# Patient Record
Sex: MALE | URBAN - METROPOLITAN AREA
[De-identification: names, ages, dates, MRNs, and addresses within clinical notes are randomized per-mention and may not be internally consistent; named-entity substitution may affect disease eponyms.]

---

## 2020-11-01 ENCOUNTER — RECORDS - HEALTHEAST (OUTPATIENT)
Dept: LAB | Facility: CLINIC | Age: 70
End: 2020-11-01

## 2020-11-02 LAB
ANION GAP SERPL CALCULATED.3IONS-SCNC: 8 MMOL/L (ref 5–18)
BUN SERPL-MCNC: 44 MG/DL (ref 8–28)
CALCIUM SERPL-MCNC: 9.4 MG/DL (ref 8.5–10.5)
CHLORIDE BLD-SCNC: 110 MMOL/L (ref 98–107)
CO2 SERPL-SCNC: 21 MMOL/L (ref 22–31)
CREAT SERPL-MCNC: 1.1 MG/DL (ref 0.7–1.3)
ERYTHROCYTE [DISTWIDTH] IN BLOOD BY AUTOMATED COUNT: 14.9 % (ref 11–14.5)
GFR SERPL CREATININE-BSD FRML MDRD: >60 ML/MIN/1.73M2
GLUCOSE BLD-MCNC: 78 MG/DL (ref 70–125)
HCT VFR BLD AUTO: 32.4 % (ref 40–54)
HGB BLD-MCNC: 10.3 G/DL (ref 14–18)
MCH RBC QN AUTO: 31.7 PG (ref 27–34)
MCHC RBC AUTO-ENTMCNC: 31.8 G/DL (ref 32–36)
MCV RBC AUTO: 100 FL (ref 80–100)
PLATELET # BLD AUTO: 269 THOU/UL (ref 140–440)
PMV BLD AUTO: 10.5 FL (ref 8.5–12.5)
POTASSIUM BLD-SCNC: 4.8 MMOL/L (ref 3.5–5)
RBC # BLD AUTO: 3.25 MILL/UL (ref 4.4–6.2)
SODIUM SERPL-SCNC: 139 MMOL/L (ref 136–145)
WBC: 5.5 THOU/UL (ref 4–11)

## 2022-01-01 ENCOUNTER — HEALTH MAINTENANCE LETTER (OUTPATIENT)
Age: 72
End: 2022-01-01

## 2022-05-03 ENCOUNTER — MEDICAL CORRESPONDENCE (OUTPATIENT)
Dept: HEALTH INFORMATION MANAGEMENT | Facility: CLINIC | Age: 72
End: 2022-05-03

## 2022-05-25 ENCOUNTER — ANESTHESIA EVENT (OUTPATIENT)
Dept: SURGERY | Facility: AMBULATORY SURGERY CENTER | Age: 72
End: 2022-05-25
Payer: COMMERCIAL

## 2022-05-25 ENCOUNTER — TELEPHONE (OUTPATIENT)
Dept: GASTROENTEROLOGY | Facility: CLINIC | Age: 72
End: 2022-05-25

## 2022-05-25 DIAGNOSIS — Z11.59 ENCOUNTER FOR SCREENING FOR OTHER VIRAL DISEASES: Primary | ICD-10-CM

## 2022-05-25 NOTE — TELEPHONE ENCOUNTER
Spoke with representative at [facility], requested that images for CT abdomen/pelvis on 4/4/2022 be pushed to [place] per request of Dr. Pizarro.  Patient is being referred for EUS due to pancreatic lesions.  Images received in [place] PACS, provider notified.    Jessenia Don RN

## 2022-05-26 RX ORDER — ONDANSETRON 4 MG/1
4 TABLET, FILM COATED ORAL 3 TIMES DAILY PRN
COMMUNITY

## 2022-05-26 RX ORDER — TRAZODONE HYDROCHLORIDE 50 MG/1
50 TABLET, FILM COATED ORAL AT BEDTIME
COMMUNITY

## 2022-05-26 RX ORDER — PYRIDOSTIGMINE BROMIDE 60 MG/1
60 TABLET ORAL 3 TIMES DAILY
COMMUNITY

## 2022-05-26 RX ORDER — FERROUS GLUCONATE 324(37.5)
1 TABLET ORAL DAILY
COMMUNITY

## 2022-05-26 RX ORDER — ACETAMINOPHEN 325 MG/1
325 TABLET ORAL EVERY 6 HOURS PRN
COMMUNITY

## 2022-05-26 RX ORDER — INSULIN LISPRO 100 [IU]/ML
54 INJECTION, SOLUTION INTRAVENOUS; SUBCUTANEOUS EVERY MORNING
COMMUNITY

## 2022-05-26 RX ORDER — INSULIN LISPRO 100 [IU]/ML
30 INJECTION, SOLUTION INTRAVENOUS; SUBCUTANEOUS
COMMUNITY

## 2022-05-26 RX ORDER — POLYETHYLENE GLYCOL 3350 17 G/17G
1 POWDER, FOR SOLUTION ORAL DAILY PRN
COMMUNITY

## 2022-05-26 RX ORDER — LOPERAMIDE HCL 2 MG
2 CAPSULE ORAL 4 TIMES DAILY PRN
COMMUNITY

## 2022-05-26 RX ORDER — SODIUM BICARBONATE 325 MG/1
325 TABLET ORAL 3 TIMES DAILY
COMMUNITY

## 2022-05-26 RX ORDER — BACLOFEN 20 MG
1 TABLET ORAL 2 TIMES DAILY
COMMUNITY

## 2022-05-26 RX ORDER — SERTRALINE HYDROCHLORIDE 25 MG/1
25 TABLET, FILM COATED ORAL DAILY
COMMUNITY

## 2022-05-26 RX ORDER — GABAPENTIN 300 MG/1
300 CAPSULE ORAL 2 TIMES DAILY
COMMUNITY

## 2022-05-26 RX ORDER — RIVASTIGMINE TARTRATE 3 MG/1
3 CAPSULE ORAL 2 TIMES DAILY
COMMUNITY

## 2022-05-26 RX ORDER — FUROSEMIDE 40 MG
40 TABLET ORAL EVERY MORNING
COMMUNITY

## 2022-05-26 RX ORDER — INSULIN LISPRO 100 [IU]/ML
34 INJECTION, SOLUTION INTRAVENOUS; SUBCUTANEOUS
COMMUNITY

## 2022-05-31 RX ORDER — PROCHLORPERAZINE MALEATE 5 MG
5 TABLET ORAL EVERY 6 HOURS PRN
Status: CANCELLED | OUTPATIENT
Start: 2022-05-31

## 2022-05-31 RX ORDER — ONDANSETRON 4 MG/1
4 TABLET, ORALLY DISINTEGRATING ORAL EVERY 6 HOURS PRN
Status: CANCELLED | OUTPATIENT
Start: 2022-05-31

## 2022-05-31 RX ORDER — NALOXONE HYDROCHLORIDE 0.4 MG/ML
0.4 INJECTION, SOLUTION INTRAMUSCULAR; INTRAVENOUS; SUBCUTANEOUS
Status: CANCELLED | OUTPATIENT
Start: 2022-05-31

## 2022-05-31 RX ORDER — FLUMAZENIL 0.1 MG/ML
0.2 INJECTION, SOLUTION INTRAVENOUS
Status: CANCELLED | OUTPATIENT
Start: 2022-05-31 | End: 2022-05-31

## 2022-05-31 RX ORDER — NALOXONE HYDROCHLORIDE 0.4 MG/ML
0.2 INJECTION, SOLUTION INTRAMUSCULAR; INTRAVENOUS; SUBCUTANEOUS
Status: CANCELLED | OUTPATIENT
Start: 2022-05-31

## 2022-05-31 RX ORDER — ONDANSETRON 2 MG/ML
4 INJECTION INTRAMUSCULAR; INTRAVENOUS EVERY 6 HOURS PRN
Status: CANCELLED | OUTPATIENT
Start: 2022-05-31

## 2022-06-01 ENCOUNTER — HOSPITAL ENCOUNTER (OUTPATIENT)
Facility: AMBULATORY SURGERY CENTER | Age: 72
Discharge: HOME | End: 2022-06-01
Attending: INTERNAL MEDICINE
Payer: COMMERCIAL

## 2022-06-01 ENCOUNTER — ANESTHESIA (OUTPATIENT)
Dept: SURGERY | Facility: AMBULATORY SURGERY CENTER | Age: 72
End: 2022-06-01
Payer: COMMERCIAL

## 2022-06-01 VITALS
DIASTOLIC BLOOD PRESSURE: 48 MMHG | WEIGHT: 278.7 LBS | RESPIRATION RATE: 18 BRPM | TEMPERATURE: 97.3 F | OXYGEN SATURATION: 96 % | HEART RATE: 63 BPM | BODY MASS INDEX: 37.75 KG/M2 | HEIGHT: 72 IN | SYSTOLIC BLOOD PRESSURE: 113 MMHG

## 2022-06-01 VITALS — HEART RATE: 60 BPM

## 2022-06-01 LAB
GLUCOSE BLDC GLUCOMTR-MCNC: 155 MG/DL (ref 70–99)
GLUCOSE BLDC GLUCOMTR-MCNC: 161 MG/DL (ref 70–99)
UPPER EUS: NORMAL

## 2022-06-01 PROCEDURE — 88305 TISSUE EXAM BY PATHOLOGIST: CPT | Performed by: PATHOLOGY

## 2022-06-01 PROCEDURE — G8918 PT W/O PREOP ORDER IV AB PRO: HCPCS

## 2022-06-01 PROCEDURE — G8907 PT DOC NO EVENTS ON DISCHARG: HCPCS

## 2022-06-01 PROCEDURE — 88173 CYTOPATH EVAL FNA REPORT: CPT | Performed by: PATHOLOGY

## 2022-06-01 PROCEDURE — 43238 EGD US FINE NEEDLE BX/ASPIR: CPT

## 2022-06-01 RX ORDER — PROPOFOL 10 MG/ML
INJECTION, EMULSION INTRAVENOUS PRN
Status: DISCONTINUED | OUTPATIENT
Start: 2022-06-01 | End: 2022-06-01

## 2022-06-01 RX ORDER — PROPOFOL 10 MG/ML
INJECTION, EMULSION INTRAVENOUS CONTINUOUS PRN
Status: DISCONTINUED | OUTPATIENT
Start: 2022-06-01 | End: 2022-06-01

## 2022-06-01 RX ORDER — SODIUM CHLORIDE, SODIUM LACTATE, POTASSIUM CHLORIDE, CALCIUM CHLORIDE 600; 310; 30; 20 MG/100ML; MG/100ML; MG/100ML; MG/100ML
INJECTION, SOLUTION INTRAVENOUS CONTINUOUS
Status: DISCONTINUED | OUTPATIENT
Start: 2022-06-01 | End: 2022-06-02 | Stop reason: HOSPADM

## 2022-06-01 RX ORDER — LIDOCAINE 40 MG/G
CREAM TOPICAL
Status: DISCONTINUED | OUTPATIENT
Start: 2022-06-01 | End: 2022-06-02 | Stop reason: HOSPADM

## 2022-06-01 RX ORDER — LIDOCAINE HYDROCHLORIDE 20 MG/ML
INJECTION, SOLUTION INFILTRATION; PERINEURAL PRN
Status: DISCONTINUED | OUTPATIENT
Start: 2022-06-01 | End: 2022-06-01

## 2022-06-01 RX ORDER — ONDANSETRON 2 MG/ML
4 INJECTION INTRAMUSCULAR; INTRAVENOUS
Status: DISCONTINUED | OUTPATIENT
Start: 2022-06-01 | End: 2022-06-02 | Stop reason: HOSPADM

## 2022-06-01 RX ADMIN — PROPOFOL 80 MG: 10 INJECTION, EMULSION INTRAVENOUS at 14:56

## 2022-06-01 RX ADMIN — SODIUM CHLORIDE, SODIUM LACTATE, POTASSIUM CHLORIDE, CALCIUM CHLORIDE: 600; 310; 30; 20 INJECTION, SOLUTION INTRAVENOUS at 14:13

## 2022-06-01 RX ADMIN — PROPOFOL 100 MCG/KG/MIN: 10 INJECTION, EMULSION INTRAVENOUS at 15:01

## 2022-06-01 RX ADMIN — LIDOCAINE HYDROCHLORIDE 100 MG: 20 INJECTION, SOLUTION INFILTRATION; PERINEURAL at 14:56

## 2022-06-01 NOTE — ANESTHESIA POSTPROCEDURE EVALUATION
Patient: Ha Mane    Procedure: Procedure(s):  ESOPHAGOGASTRODUODENOSCOPY, WITH ENDOSCOPIC US  ESOPHAGOGASTRODUODENOSCOPY, WITH FINE NEEDLE ASPIRATION BIOPSY, WITH ENDOSCOPIC ULTRASOUND GUIDANCE       Anesthesia Type:  MAC    Note:  Disposition: Outpatient   Postop Pain Control: Uneventful            Sign Out: Well controlled pain   PONV: No   Neuro/Psych: Uneventful            Sign Out: Acceptable/Baseline neuro status   Airway/Respiratory: Uneventful            Sign Out: Acceptable/Baseline resp. status   CV/Hemodynamics: Uneventful            Sign Out: Acceptable CV status   Other NRE: NONE   DID A NON-ROUTINE EVENT OCCUR? No           Last vitals:  Vitals Value Taken Time   /67 06/01/22 1615   Temp     Pulse 62 06/01/22 1615   Resp     SpO2 95 % 06/01/22 1615       Electronically Signed By: Callum Hutchison MD  June 1, 2022  4:50 PM

## 2022-06-01 NOTE — ANESTHESIA PREPROCEDURE EVALUATION
Anesthesia Pre-Procedure Evaluation    Patient: Ha Mane   MRN: [id number] : 1950        Procedure : Procedure(s):  ESOPHAGOGASTRODUODENOSCOPY, WITH ENDOSCOPIC US          Past Medical History:   Diagnosis Date     Bladder tumor     Has Urostomy now     BPH (benign prostatic hyperplasia)      Cellulitis      Chronic infection      Dementia (H)      Diabetes (H)      HTN (hypertension)      Insomnia      Major neurocognitive disorder, due to vascular disease, without behavioral disturbance, mild (H)      Myasthenia gravis (H)      Pancreas cyst      Personal history of malignant neoplasm of bladder      Reactive gastropathy      Sleep apnea     uses cpap      Past Surgical History:   Procedure Laterality Date     GENITOURINARY SURGERY      Urostomy      No Known Allergies   Social History     Tobacco Use     Smoking status: Never Smoker     Smokeless tobacco: Never Used   Substance Use Topics     Alcohol use: Not Currently     Comment: occasional      Wt Readings from Last 1 Encounters:   22 126.4 kg (278 lb 11.2 oz)        Anesthesia Evaluation            ROS/MED HX  ENT/Pulmonary:     (+) sleep apnea,     Neurologic: Comment: Hx of myasthenia gravis    (+) dementia,     Cardiovascular: Comment: Peripheral Vascular Disease: necrotic area on heel currently being treated.    (+) hypertension-----    METS/Exercise Tolerance:     Hematologic:       Musculoskeletal:       GI/Hepatic:       Renal/Genitourinary:       Endo:     (+) type II DM,     Psychiatric/Substance Use:       Infectious Disease:       Malignancy:   (+) Malignancy, History of Other.Other CA bladder Remission status post Surgery.    Other:            Physical Exam    Airway  airway exam normal      Mallampati: II   TM distance: > 3 FB   Neck ROM: full   Mouth opening: > 3 cm    Respiratory Devices and Support         Dental  no notable dental history         Cardiovascular          Rhythm and rate: regular and normal     Pulmonary    pulmonary exam normal        breath sounds clear to auscultation           OUTSIDE LABS:  CBC: No results found for: WBC, HGB, HCT, PLT  BMP:   Lab Results   Component Value Date     (H) 06/01/2022     COAGS: No results found for: PTT, INR, FIBR  POC: No results found for: BGM, HCG, HCGS  HEPATIC: No results found for: ALBUMIN, PROTTOTAL, ALT, AST, GGT, ALKPHOS, BILITOTAL, BILIDIRECT, RESHMA  OTHER: No results found for: PH, LACT, A1C, SHIRA, PHOS, MAG, LIPASE, AMYLASE, TSH, T4, T3, CRP, SED    Anesthesia Plan    ASA Status:  3   NPO Status:  NPO Appropriate    Anesthesia Type: MAC.     - Reason for MAC: immobility needed, straight local not clinically adequate   Induction: Intravenous.   Maintenance: TIVA.        Consents    Anesthesia Plan(s) and associated risks, benefits, and realistic alternatives discussed. Questions answered and patient/representative(s) expressed understanding.    - Discussed:     - Discussed with:  Patient      - Extended Intubation/Ventilatory Support Discussed: No.      - Patient is DNR/DNI Status: No    Use of blood products discussed: No .     Postoperative Care    Pain management: IV analgesics, Oral pain medications, Multi-modal analgesia.   PONV prophylaxis: Ondansetron (or other 5HT-3), Background Propofol Infusion     Comments:                Callum Hutchison MD

## 2022-06-01 NOTE — ANESTHESIA CARE TRANSFER NOTE
Patient: Ha Mane    Procedure: Procedure(s):  ESOPHAGOGASTRODUODENOSCOPY, WITH ENDOSCOPIC US  ESOPHAGOGASTRODUODENOSCOPY, WITH FINE NEEDLE ASPIRATION BIOPSY, WITH ENDOSCOPIC ULTRASOUND GUIDANCE       Diagnosis: Pancreatic lesion [K86.9]  Diagnosis Additional Information: No value filed.    Anesthesia Type:   MAC     Note:    Oropharynx: oropharynx clear of all foreign objects  Level of Consciousness: awake  Oxygen Supplementation: nasal cannula    Independent Airway: airway patency satisfactory and stable  Dentition: dentition unchanged  Vital Signs Stable: post-procedure vital signs reviewed and stable  Report to RN Given: handoff report given  Patient transferred to: Phase II    Handoff Report: Identifed the Patient, Identified the Reponsible Provider, Reviewed the pertinent medical history, Discussed the surgical course, Reviewed Intra-OP anesthesia mangement and issues during anesthesia, Set expectations for post-procedure period and Allowed opportunity for questions and acknowledgement of understanding      Vitals:  Vitals Value Taken Time   BP     Temp     Pulse 60 06/01/22 1605   Resp     SpO2         Electronically Signed By: REMI Hernandez CRNA  June 1, 2022  4:13 PM

## 2022-06-01 NOTE — DISCHARGE INSTRUCTIONS
Post Procedure Ultrasound(EUS) Instructions    You have just had an endoscopic procedure.  Your follow-up instructions are:    - Do not drive, use heavy equipment, drink alcohol for 24 hours.  The medications you were given may cause dizziness or drowsiness and a slower reaction time.    - It is normal to burp large amounts of air and/or pass air rectally.  Some discomfort from bloating is normal.    - Resume eating, drinking, usual medications and light exercise about 1 hour after your procedure and when the gag reflex has returned.  Start drinking sips of water before eating.    [facility]  Same-Day Surgery   Adult Discharge Orders & Instructions   For 24 hours after surgery  Get plenty of rest.  A responsible adult must stay with you for at least 24 hours after you leave the hospital.   Do not drive or use heavy equipment.  If you have weakness or tingling, don't drive or use heavy equipment until this feeling goes away.  Do not drink alcohol.  Avoid strenuous or risky activities.  Ask for help when climbing stairs.   You may feel lightheaded.  IF so, sit for a few minutes before standing.  Have someone help you get up.   If you have nausea (feel sick to your stomach): Drink only clear liquids such as apple juice, ginger ale, broth or 7-Up.  Rest may also help.  Be sure to drink enough fluids.  Move to a regular diet as you feel able.  You may have a slight fever. Call the doctor if your fever is over 100 F (37.7 C) (taken under the tongue) or lasts longer than 24 hours.  You may have a dry mouth, a sore throat, muscle aches or trouble sleeping.  These should go away after 24 hours.  Do not make important or legal decisions.   Call your doctor for any of the followin.  Signs of infection (fever, growing tenderness at the surgery site, a large amount of drainage or bleeding, severe pain, foul-smelling drainage, redness, swelling).    2. It has been over 8 to 10 hours since surgery and  you are still not able to urinate (pass water).    3.  Headache for over 24 hours.    4.  Numbness, tingling or weakness the day after surgery (if you had spinal anesthesia).  To contact a doctor, call ___________________________      - Small tissue/fluid samples have been taken and your Physician will call you with the results.  Allow 10 business days to complete.    - Do not take aspirin or ibuprofen(Advil, Motrin or other anti-inflammatory drugs) for _____ days.  Tylenol(acetaminophen) is safe.    - If you develop a sore throat you may take lozenges and/or mix 1 teaspoon of salt in a cup of warm water to gargle with(do not swallow).      What To Watch For    Problems rarely occur after the exam.  It is important for you to be aware of the early signs of a possible complication.    Call Immediately if the following problems occur:     Significant throat pain or difficult swallowing   Black stools   Fever above 100.6 degrees F(37.5 degrees C)   Unusual abdominal or chest pain not relieved with belching or passing air     [phone] (Endoscopy Center Monday-Friday 7 AM-3:30 PM)  If after hours:   [phone] (Hospital ) Ask for the Adult GI Fellow on call    *If vomiting blood or having severe pain, you should go to [facility], [address] [place] [address], and bring this form with you

## 2022-06-06 LAB
PATH REPORT.COMMENTS IMP SPEC: ABNORMAL
PATH REPORT.COMMENTS IMP SPEC: ABNORMAL
PATH REPORT.COMMENTS IMP SPEC: YES
PATH REPORT.FINAL DX SPEC: ABNORMAL
PATH REPORT.GROSS SPEC: ABNORMAL
PATH REPORT.MICROSCOPIC SPEC OTHER STN: ABNORMAL

## 2022-06-07 ENCOUNTER — PATIENT OUTREACH (OUTPATIENT)
Dept: ONCOLOGY | Facility: CLINIC | Age: 72
End: 2022-06-07
Payer: COMMERCIAL

## 2022-06-07 NOTE — PROGRESS NOTES
Review of Oncology referral fr GI/ Dr Pizarro for pt with new pancreatic adenocarcinoma.    4/2022 CT AP at [facility]  1.  Postop changes of cystoprostatectomy with ileal conduit right lower quadrant. Parastomal hernia containing mesenteric fat and nonobstructed loops of small bowel.   2.  Tiny nonobstructing left renal stone. No hydronephrosis.   3.  Markedly abnormal pancreas. Specifically, there is a low-attenuation mixed solid and cystic lesion in the pancreatic head which has significantly increased in size and previously had the appearance of a cystic lesion. Similar-appearing ill-defined low-attenuation in the uncinate process also increased and remains indeterminate. Numerous cystic lesions and the remainder of the pancreatic ductal dilatation is most marked in the distal body and tail with the distal body and tail demonstrating heterogeneous decreased attenuation in multiple cystic lesions. Overall findings are indeterminate, however, cannot exclude pancreatic neoplasm such as IPMN. GI consultation recommended with ERCP and endoscopic ultrasound evaluation of the pancreas recommended.   4.  Multiple bilateral groin nodes, left greater than right, have shown progressive increase in size and are prominent in number although the majority are within normal limits for size. The largest lymph nodes are in the left groin. These are nonspecific.   5.  Postop cholecystectomy.   6.  Stable 4 mm subpleural nodule left lower lobe.   7.  Mild fatty infiltration of the liver.      6/1/2022 Upper EUS with Dr Pizarro    Procedure:                Upper EUS   Indications:        Suspected mass in pancreas on CT scan, 72-year-old                             male with a history of bladder cancer status post                             resection, significant medical comorbidities                             including uncontrolled diabetes, CKD 3, dementia,                             and peripheral vascular disease. CT scan  shows a 3                             cm mixed solid/cystic lesion body pancreas with                             possible distal ductal dilation Father with                             pancreatic cancer in his 60s. We discussed that due                             to medical comorbidities, he may not be a candidate                             for any type of pancreatic surgery and may not be a                             candidate for chemotherapy should pancreatic cancer                             be found The patient and his daughter are in                             agreement to proceed.       Final Diagnosis   Specimen A. Pancreas, 3 cm pancreatic head mass, Fine Needle Aspirate:                 Interpretation:                  Positive for malignancy, adenocarcinoma with foamy gland features, see comment                 Adequacy:                 Satisfactory for evaluation     Electronically signed by Jenifer Obrien MD on 6/6/2022 at  3:01 PM            Pt will need Med Onc to guide further care. Daughter, Sepideh, is main contact (lives in [place]?). Dr Pizarro referring to [place] location; they have no openings within 2 wks. Can offer appt w/Dr Pastor this Thur 6/9 at [facility] & MD can arrange future visits at  location if needed. Or will schedule per pt/ daughter preference.     Addendum 6/10: Pt/daughter have canceled appts with Dr Pastor; going to [facility] instead.

## 2022-06-08 ENCOUNTER — TELEPHONE (OUTPATIENT)
Dept: GASTROENTEROLOGY | Facility: CLINIC | Age: 72
End: 2022-06-08
Payer: COMMERCIAL

## 2022-06-08 NOTE — TELEPHONE ENCOUNTER
Chelsi Lowe, Jessenia Andujar, RN    This pt having an EUS with Dr Pizarro. RN from Dr Gutiérrez's office, Gillian, requesting a call back to discuss patient/procedure- told her I'd pass the message     Gillian- [phone]       Attempted to reach patient at number provided, line rings with no answer and no voicemail.   Called [facility] and requested to speak with OLIVIER French.  Message left requesting return call.     Jessenia Don, RN

## 2022-06-08 NOTE — TELEPHONE ENCOUNTER
OLIVIER French returned call.  They received a call from a Juju about this patient.  Dr. Gutiérrez's office has not yet received the pathology results.  Faxed results to OLIVIER French/Dr. Gutiérrez at [phone].  Informed that Dr. Pizarro has placed a referral to Oncology and patient is scheduled with Dr. Pastor on 6/29/2022.      Gillian will reach out if anything further is needed.    Jessenia Don RN

## 2022-06-08 NOTE — TELEPHONE ENCOUNTER
OLIVIER French left message, requesting return call to [phone].  Attempted to reach Gillian at number provided, message left requesting return call.    Jessenia Don RN

## 2022-06-09 ENCOUNTER — TELEPHONE (OUTPATIENT)
Dept: GASTROENTEROLOGY | Facility: CLINIC | Age: 72
End: 2022-06-09
Payer: COMMERCIAL

## 2022-06-09 NOTE — TELEPHONE ENCOUNTER
Call received from OLIVIER French with [facility].  She has spoken with patient's daughter and they would like to continue care within the [facility].  Gillian requests that the appointment with Dr. Pastor be cancelled, Dr. Gutiérrez has placed a referral for the patient to see Oncology within their system.  Appointment cancelled as requested.    Jessenia Don RN

## 2022-06-19 ENCOUNTER — HEALTH MAINTENANCE LETTER (OUTPATIENT)
Age: 72
End: 2022-06-19

## 2023-07-02 ENCOUNTER — HEALTH MAINTENANCE LETTER (OUTPATIENT)
Age: 73
End: 2023-07-02